# Patient Record
Sex: MALE | Race: OTHER | HISPANIC OR LATINO | URBAN - METROPOLITAN AREA
[De-identification: names, ages, dates, MRNs, and addresses within clinical notes are randomized per-mention and may not be internally consistent; named-entity substitution may affect disease eponyms.]

---

## 2018-07-20 ENCOUNTER — EMERGENCY (EMERGENCY)
Facility: HOSPITAL | Age: 58
LOS: 1 days | Discharge: HOME | End: 2018-07-20
Attending: EMERGENCY MEDICINE
Payer: COMMERCIAL

## 2018-07-20 VITALS — HEIGHT: 65 IN | WEIGHT: 145.06 LBS

## 2018-07-20 VITALS
SYSTOLIC BLOOD PRESSURE: 124 MMHG | TEMPERATURE: 99 F | HEART RATE: 66 BPM | DIASTOLIC BLOOD PRESSURE: 80 MMHG | RESPIRATION RATE: 20 BRPM | OXYGEN SATURATION: 97 %

## 2018-07-20 PROCEDURE — 99283 EMERGENCY DEPT VISIT LOW MDM: CPT | Mod: 25

## 2018-07-20 PROCEDURE — 29125 APPL SHORT ARM SPLINT STATIC: CPT

## 2018-07-20 PROCEDURE — 73130 X-RAY EXAM OF HAND: CPT | Mod: 26,RT

## 2018-07-20 PROCEDURE — 73130 X-RAY EXAM OF HAND: CPT

## 2018-07-20 PROCEDURE — 90471 IMMUNIZATION ADMIN: CPT

## 2018-07-20 PROCEDURE — 90715 TDAP VACCINE 7 YRS/> IM: CPT

## 2018-07-20 PROCEDURE — 29125 APPL SHORT ARM SPLINT STATIC: CPT | Mod: RT

## 2018-07-20 PROCEDURE — T1013: CPT

## 2018-07-20 RX ORDER — CEPHALEXIN 500 MG
500 CAPSULE ORAL ONCE
Qty: 0 | Refills: 0 | Status: COMPLETED | OUTPATIENT
Start: 2018-07-20 | End: 2018-07-20

## 2018-07-20 RX ORDER — TETANUS TOXOID, REDUCED DIPHTHERIA TOXOID AND ACELLULAR PERTUSSIS VACCINE, ADSORBED 5; 2.5; 8; 8; 2.5 [IU]/.5ML; [IU]/.5ML; UG/.5ML; UG/.5ML; UG/.5ML
0.5 SUSPENSION INTRAMUSCULAR ONCE
Qty: 0 | Refills: 0 | Status: COMPLETED | OUTPATIENT
Start: 2018-07-20 | End: 2018-07-20

## 2018-07-20 RX ADMIN — TETANUS TOXOID, REDUCED DIPHTHERIA TOXOID AND ACELLULAR PERTUSSIS VACCINE, ADSORBED 0.5 MILLILITER(S): 5; 2.5; 8; 8; 2.5 SUSPENSION INTRAMUSCULAR at 20:08

## 2018-07-20 RX ADMIN — Medication 500 MILLIGRAM(S): at 21:19

## 2018-07-20 NOTE — ED PROVIDER NOTE - OBJECTIVE STATEMENT
Patient is a 57 y/o male c/o of right hand pain s/p injury yesterday at work. Patient states he was at work when an iron press crushed his right hand. Patient admits the right hand is very swollen. Patient admits to minimal pain in the right hand. Patient admits to abrasion over his right 1st digit from the iron press. Patient denies knowing when his last tetanus shot was. Patient denies numbness or loss of sensation, decreased ROM in extremities, fevers/

## 2018-07-20 NOTE — ED PROVIDER NOTE - ATTENDING CONTRIBUTION TO CARE
Patient is a 57 y/o male c/o of right hand pain s/p injury yesterday at work. Patient states he was at work when an iron press crushed his right hand. Patient admits the right hand is very swollen. Patient admits to minimal pain in the right hand. Patient admits to abrasion over his right 1st digit from the iron press. Patient denies knowing when his last tetanus shot.  pe  rt hand deformity of 1st digit ; shortened; decreased rom; and strength;   dx fthumb fx ;; thumb spicca  and referral to hand surgery

## 2018-07-20 NOTE — ED PROVIDER NOTE - CARE PLAN
Principal Discharge DX:	Closed displaced fracture of phalanx of thumb, unspecified laterality, unspecified phalanx, initial encounter

## 2018-07-20 NOTE — ED PROVIDER NOTE - PROGRESS NOTE DETAILS
Fracture noted on x-ray, will start on antibiotics, will splint pt in thumb spica, pt explained result and d/c instructions, ortho referral, d/c

## 2018-07-20 NOTE — ED PROVIDER NOTE - PRINCIPAL DIAGNOSIS
Closed displaced fracture of phalanx of thumb, unspecified laterality, unspecified phalanx, initial encounter

## 2018-07-20 NOTE — ED ADULT NURSE NOTE - OBJECTIVE STATEMENT
59 y/o male presents to the ed c/o right hand pain after incident at work. pt is awake alert oriented following commands and speaking coherently. states iron press pressed on his hand yesterday at work. swelling noted to right hand

## 2018-07-20 NOTE — ED PROVIDER NOTE - PHYSICAL EXAMINATION
Const: Awake, alert and oriented. In no acute distress. Well appearing.  HEENT: NC/AT. Moist mucous membranes.  Eyes: No scleral icterus. EOMI.  Neck:. Soft and supple. Full ROM without pain.  Cardiac: Regular rate and regular rhythm. +S1/S2. No murmurs.   Resp: Speaking in full sentences. No evidence of respiratory distress. No wheezes, rales or rhonchi.  Abd: Soft, non-tender, non-distended. Normal bowel sounds in all 4 quadrants. No guarding or rebound.  Back: Spine midline and non-tender. No CVAT.  MSK: Minimal tenderness over right hand, FROM in all extremities, neurovascularly intact, radial pulse 2+, hand  5/5   Skin: Swelling and ecchymosis noted over right hand, abrasion noted to right 1st digit, not actively bleeding   Lymph: No cervical lymphadenopathy.  Neuro: Awake, alert & oriented x 3. Moves all extremities symmetrically.

## 2019-05-29 NOTE — ED ADULT NURSE NOTE - NSSISCREENINGQ2_ED_A_ED
Spoke to patient's spouse Deepak Antoine, confirmed procedure date of 6/3/19 with Dr Eliecer Jackson and to be checked in at outpatient registration at 9:30 am. Patient instructed to call pre-procedure line before procedure at [phone].  Patient instructed to call office i
No

## 2021-02-11 NOTE — ED PROCEDURE NOTE - NS ED APPLIED SPLINTS 1
..Ace Wrap

## 2021-05-17 NOTE — ED ADULT TRIAGE NOTE - MODE OF ARRIVAL
Walk in has bilateral HA, not wearing at pST visit/L/R has bilateral HA, not wearing at PST visit/L/R

## 2021-09-01 ENCOUNTER — EMERGENCY (EMERGENCY)
Facility: HOSPITAL | Age: 61
LOS: 1 days | Discharge: HOME | End: 2021-09-01
Attending: EMERGENCY MEDICINE
Payer: SELF-PAY

## 2021-09-01 VITALS
HEIGHT: 65 IN | TEMPERATURE: 98 F | RESPIRATION RATE: 16 BRPM | HEART RATE: 63 BPM | WEIGHT: 160.06 LBS | OXYGEN SATURATION: 97 % | DIASTOLIC BLOOD PRESSURE: 99 MMHG | SYSTOLIC BLOOD PRESSURE: 146 MMHG

## 2021-09-01 PROCEDURE — T1013: CPT

## 2021-09-01 PROCEDURE — 96374 THER/PROPH/DIAG INJ IV PUSH: CPT

## 2021-09-01 PROCEDURE — 99284 EMERGENCY DEPT VISIT MOD MDM: CPT | Mod: 25

## 2021-09-01 PROCEDURE — 73140 X-RAY EXAM OF FINGER(S): CPT

## 2021-09-01 PROCEDURE — 99284 EMERGENCY DEPT VISIT MOD MDM: CPT

## 2021-09-01 PROCEDURE — 73140 X-RAY EXAM OF FINGER(S): CPT | Mod: 26,LT

## 2021-09-01 RX ORDER — ACETAMINOPHEN 500 MG
650 TABLET ORAL ONCE
Refills: 0 | Status: COMPLETED | OUTPATIENT
Start: 2021-09-01 | End: 2021-09-01

## 2021-09-01 RX ORDER — CEFAZOLIN SODIUM 1 G
2000 VIAL (EA) INJECTION ONCE
Refills: 0 | Status: COMPLETED | OUTPATIENT
Start: 2021-09-01 | End: 2021-09-01

## 2021-09-01 RX ORDER — ACETAMINOPHEN 500 MG
1 TABLET ORAL
Qty: 20 | Refills: 0
Start: 2021-09-01

## 2021-09-01 RX ADMIN — Medication 650 MILLIGRAM(S): at 13:33

## 2021-09-01 RX ADMIN — Medication 100 MILLIGRAM(S): at 13:33

## 2021-09-01 NOTE — ED PROVIDER NOTE - PHYSICAL EXAMINATION
Const: AOX3 nontoxic appearing, no apparent respiratory or physical distress. Stable gait   HEENT: NC/AT. Moist mucous membranes.  Eyes: DELMI. EOMI  Neck: Soft and supple. Full ROM without pain.  Resp: Speaking in full sentences. No evidence of respiratory distress.  Skin:  left mid finger : nail gone nail bed . superficial skin lac on palmar aspect of the distal finger mid bleeding no bone visible   Lymph: No cervical lymphadenopathy.  Neuro: Awake, alert & oriented x 3. Moves all extremities symmetrically.

## 2021-09-01 NOTE — ED PROVIDER NOTE - PATIENT PORTAL LINK FT
You can access the FollowMyHealth Patient Portal offered by [facility] by registering at the following website: [email or web address]. By joining [facility]’s FollowMyHealth portal, you will also be able to view your health information using other applications (apps) compatible with our system.

## 2021-09-01 NOTE — ED PROVIDER NOTE - NSFOLLOWUPINSTRUCTIONS_ED_ALL_ED_FT
Es muy importante llamar y hacer un seguimiento con un especialista en reji según lo indicado.  trevon antibióticos y tylenoles según sea necesario para el dolor  no jesús la herida anthony los próximos 2 días  no lo mojes '      Laceración    Ena laceración es un toni que atraviesa todas las capas de la piel y penetra en el tejido que se encuentra barbi debajo de la piel. Algunas laceraciones se curan por sí solas. Otros deben cerrarse con tiras adhesivas para la piel, pegamento para la piel, puntos (suturas) o grapas. El cuidado adecuado de la laceración minimiza el riesgo de infección y ayuda a que la laceración se cure mejor. Si se cuba colocado suturas o grapas no absorbibles, deben retirarse dentro del plazo indicado por reyes proveedor de atención médica.    BUSQUE ATENCIÓN MÉDICA INMEDIATA SI TIENE ALGUNO DE LOS SIGUIENTES SÍNTOMAS: hinchazón alrededor de la herida, empeoramiento del dolor, supuración de la herida, vetas brooke que desaparecen de la herida, incapacidad para  el dedo de la mano o del pie cerca de la laceración o decoloración de la piel cerca de la herida. laceración.

## 2021-09-01 NOTE — ED ADULT TRIAGE NOTE - DOMESTIC TRAVEL HIGH RISK QUESTION
Message   Recorded as Task   Date: 09/01/2016 01:56 PM, Created By: Mona Benitez   Task Name: Med Renewal Request   Assigned To: Diomedes Vallejo   Regarding Patient: Kathy Ferrell, Status: Active   CommentMicheljosi Click - 01 Sep 2016 1:56 PM     TASK CREATED  pt called for a refill on her estradiol 0 5mg  She's requesting a 90 day supply to [address] [facility] [address]   [facility] - 01 Sep 2016 2:29 PM     TASK EDITED                 sent to pharm        Active Problems    1  Encounter for routine gynecological examination (V72 31) (Z01 419)   2  Encounter for screening mammogram for malignant neoplasm of breast (V76 12)   (Z12 31)   3  Postmenopausal disorder (627 9) (N95 9)    Current Meds   1  Estradiol 0 5 MG Oral Tablet; take 1 tablet by mouth every day; Therapy: [id number] to (Shannan Bui)  Requested for: [id number]; Last   Rx:[id number] Ordered   2  Metoprolol Tartrate 25 MG Oral Tablet; Therapy: [id number] to (Last Rx:25Nov2011)  Requested for: 25Nov2011 Ordered    Allergies    1   No Known Drug Allergies    Plan  Postmenopausal disorder    · Estradiol 0 5 MG Oral Tablet; take 1 tablet by mouth every day    Signatures   Electronically signed by : Guillermo Johnson, ; Sep  1 2016  2:29PM EST                       (Author)
No

## 2021-09-01 NOTE — ED PROVIDER NOTE - ATTENDING CONTRIBUTION TO CARE
Ish CORRAL- 60 Y/O M with h/o dm, htn p/w left 3rd finger laceration at tip and cut of nail after it got stuck between truck and a lever while at work today few hrs ago. Last tdap 2 yrs ago. Pt still has bleeding from finger and holding it.     Pt is alert, well appearing male, s1s2 normal reg, b/l clear breath sounds, abd soft, nt, nd , left 3rd finger laceration at tip and nail partially avulse, mild tip amputation with bone seen , but full rom at 3rd finger    plan to clean wound, control bleeding, xr finger and will give iv anitbiotic, likley po antibitoic and discharge home with hand follow up

## 2021-09-01 NOTE — ED ADULT NURSE NOTE - OBJECTIVE STATEMENT
61 year old male, present to the ED with injury to his left middle finger from a gate at work. Bleeding controlled at this time, Patient A/Ox4, respirations are even and non labored, abdomen is soft and non tender, NAD noted.

## 2021-09-01 NOTE — ED PROCEDURE NOTE - CPROC ED POST PROC CARE GUIDE1
Verbal/written post procedure instructions were given to patient/caregiver./Instructed patient/caregiver regarding signs and symptoms of infection.
Verbal/written post procedure instructions were given to patient/caregiver./Instructed patient/caregiver regarding signs and symptoms of infection./Elevate the injured extremity as instructed.

## 2021-09-01 NOTE — ED PROVIDER NOTE - OBJECTIVE STATEMENT
61 y.o male Pmh of DM , HTN S.p left mid finger injury today about 9AM at work . states his midl left finger got stock between the truck and lift and was bleeding . he has tip of the finger and nail that is been  . last tetanus was 5 yrs ago

## 2021-09-01 NOTE — ED PROVIDER NOTE - PROGRESS NOTE DETAILS
xray ?diatl tuft fracture applied Gelfoam and dressing bleeding stopped  could make appointment with dr joyner 9/2/2021 11Am

## 2025-05-05 ENCOUNTER — APPOINTMENT (OUTPATIENT)
Dept: DERMATOLOGY | Facility: CLINIC | Age: 65
End: 2025-05-05
Payer: MEDICARE

## 2025-05-05 VITALS — HEIGHT: 65 IN | WEIGHT: 116 LBS | BODY MASS INDEX: 19.33 KG/M2

## 2025-05-05 DIAGNOSIS — A63.0 ANOGENITAL (VENEREAL) WARTS: ICD-10-CM

## 2025-05-05 PROBLEM — Z00.00 ENCOUNTER FOR PREVENTIVE HEALTH EXAMINATION: Status: ACTIVE | Noted: 2025-05-05

## 2025-05-05 PROCEDURE — 54065 DESTRUCTION PENIS LESION(S): CPT

## 2025-05-05 PROCEDURE — 99204 OFFICE O/P NEW MOD 45 MIN: CPT | Mod: 25

## 2025-05-05 RX ORDER — METFORMIN HYDROCHLORIDE 850 MG/1
850 TABLET, COATED ORAL
Refills: 0 | Status: ACTIVE | COMMUNITY

## 2025-05-05 RX ORDER — ROSUVASTATIN CALCIUM 5 MG/1
5 TABLET, FILM COATED ORAL
Refills: 0 | Status: ACTIVE | COMMUNITY

## 2025-05-05 RX ORDER — GLIMEPIRIDE 4 MG/1
4 TABLET ORAL
Refills: 0 | Status: ACTIVE | COMMUNITY

## 2025-05-05 RX ORDER — LISINOPRIL 10 MG/1
10 TABLET ORAL
Refills: 0 | Status: ACTIVE | COMMUNITY

## 2025-05-05 RX ORDER — EMPAGLIFLOZIN 25 MG/1
25 TABLET, FILM COATED ORAL
Refills: 0 | Status: ACTIVE | COMMUNITY

## 2025-05-05 RX ORDER — IMIQUIMOD 50 MG/G
5 CREAM TOPICAL
Qty: 1 | Refills: 2 | Status: ACTIVE | COMMUNITY
Start: 2025-05-05 | End: 1900-01-01

## 2025-06-05 ENCOUNTER — APPOINTMENT (OUTPATIENT)
Dept: DERMATOLOGY | Facility: CLINIC | Age: 65
End: 2025-06-05
Payer: MEDICARE

## 2025-06-05 DIAGNOSIS — A63.0 ANOGENITAL (VENEREAL) WARTS: ICD-10-CM

## 2025-06-05 PROCEDURE — 99214 OFFICE O/P EST MOD 30 MIN: CPT | Mod: 25

## 2025-06-05 PROCEDURE — 54065 DESTRUCTION PENIS LESION(S): CPT

## 2025-07-09 ENCOUNTER — APPOINTMENT (OUTPATIENT)
Dept: DERMATOLOGY | Facility: CLINIC | Age: 65
End: 2025-07-09
Payer: MEDICARE

## 2025-07-09 PROCEDURE — 54065 DESTRUCTION PENIS LESION(S): CPT

## 2025-07-09 PROCEDURE — 99214 OFFICE O/P EST MOD 30 MIN: CPT | Mod: 25

## 2025-07-31 ENCOUNTER — APPOINTMENT (OUTPATIENT)
Dept: DERMATOLOGY | Facility: CLINIC | Age: 65
End: 2025-07-31
Payer: MEDICARE

## 2025-07-31 DIAGNOSIS — L98.9 DISORDER OF THE SKIN AND SUBCUTANEOUS TISSUE, UNSPECIFIED: ICD-10-CM

## 2025-07-31 DIAGNOSIS — A63.0 ANOGENITAL (VENEREAL) WARTS: ICD-10-CM

## 2025-07-31 PROCEDURE — 54065 DESTRUCTION PENIS LESION(S): CPT

## 2025-07-31 PROCEDURE — 99213 OFFICE O/P EST LOW 20 MIN: CPT | Mod: 25

## 2025-07-31 RX ORDER — MUPIROCIN 20 MG/G
2 OINTMENT TOPICAL
Qty: 1 | Refills: 3 | Status: ACTIVE | COMMUNITY
Start: 2025-07-31 | End: 1900-01-01

## 2025-08-21 ENCOUNTER — APPOINTMENT (OUTPATIENT)
Dept: DERMATOLOGY | Facility: CLINIC | Age: 65
End: 2025-08-21
Payer: MEDICARE

## 2025-08-21 DIAGNOSIS — A63.0 ANOGENITAL (VENEREAL) WARTS: ICD-10-CM

## 2025-08-21 PROCEDURE — 99213 OFFICE O/P EST LOW 20 MIN: CPT | Mod: 25

## 2025-08-21 PROCEDURE — 54065 DESTRUCTION PENIS LESION(S): CPT
